# Patient Record
Sex: FEMALE | Race: WHITE | URBAN - METROPOLITAN AREA
[De-identification: names, ages, dates, MRNs, and addresses within clinical notes are randomized per-mention and may not be internally consistent; named-entity substitution may affect disease eponyms.]

---

## 2023-04-19 ENCOUNTER — OFFICE VISIT (OUTPATIENT)
Dept: URBAN - METROPOLITAN AREA CLINIC 5 | Facility: CLINIC | Age: 33
End: 2023-04-19
Payer: COMMERCIAL

## 2023-04-19 DIAGNOSIS — H52.13 MYOPIA, BILATERAL: ICD-10-CM

## 2023-04-19 DIAGNOSIS — H35.9 UNSPECIFIED RETINAL DISORDER: Primary | ICD-10-CM

## 2023-04-19 PROCEDURE — 92014 COMPRE OPH EXAM EST PT 1/>: CPT | Performed by: OPTOMETRIST

## 2023-04-19 PROCEDURE — 92250 FUNDUS PHOTOGRAPHY W/I&R: CPT | Performed by: OPTOMETRIST

## 2023-04-19 ASSESSMENT — INTRAOCULAR PRESSURE
OS: 16
OD: 14

## 2023-04-19 ASSESSMENT — VISUAL ACUITY
OD: 20/20
OS: 20/20

## 2023-04-19 NOTE — IMPRESSION/PLAN
Impression: Unspecified retinal disorder: H35.9. Plan: I/T window defect OD - flat/pigmented edges/longstanding/stable. FP ordered today. Monitor.

## 2024-10-14 ENCOUNTER — OFFICE VISIT (OUTPATIENT)
Dept: URBAN - METROPOLITAN AREA CLINIC 6 | Facility: CLINIC | Age: 34
End: 2024-10-14
Payer: COMMERCIAL

## 2024-10-14 DIAGNOSIS — H52.13 MYOPIA, BILATERAL: Primary | ICD-10-CM

## 2024-10-14 PROCEDURE — 92014 COMPRE OPH EXAM EST PT 1/>: CPT | Performed by: OPTOMETRIST

## 2024-10-14 ASSESSMENT — VISUAL ACUITY
OD: 20/20
OS: 20/20

## 2024-10-14 ASSESSMENT — INTRAOCULAR PRESSURE
OD: 14
OS: 14